# Patient Record
Sex: MALE | Race: WHITE | Employment: FULL TIME | URBAN - METROPOLITAN AREA
[De-identification: names, ages, dates, MRNs, and addresses within clinical notes are randomized per-mention and may not be internally consistent; named-entity substitution may affect disease eponyms.]

---

## 2017-12-08 ENCOUNTER — OFFICE VISIT (OUTPATIENT)
Dept: FAMILY MEDICINE CLINIC | Age: 27
End: 2017-12-08
Payer: COMMERCIAL

## 2017-12-08 VITALS
HEART RATE: 89 BPM | OXYGEN SATURATION: 97 % | HEIGHT: 74 IN | WEIGHT: 133 LBS | TEMPERATURE: 97.7 F | DIASTOLIC BLOOD PRESSURE: 63 MMHG | BODY MASS INDEX: 17.07 KG/M2 | SYSTOLIC BLOOD PRESSURE: 95 MMHG | RESPIRATION RATE: 16 BRPM

## 2017-12-08 DIAGNOSIS — Z00.00 VISIT FOR WELL MAN HEALTH CHECK: Primary | ICD-10-CM

## 2017-12-08 PROCEDURE — 99385 PREV VISIT NEW AGE 18-39: CPT | Performed by: FAMILY MEDICINE

## 2017-12-08 ASSESSMENT — ENCOUNTER SYMPTOMS
VOMITING: 0
EYE ITCHING: 0
NAUSEA: 0
ABDOMINAL PAIN: 0
SHORTNESS OF BREATH: 0
SINUS PRESSURE: 1
DIARRHEA: 0
COLOR CHANGE: 0
SORE THROAT: 0
COUGH: 0
VOICE CHANGE: 0
BLOOD IN STOOL: 0
ROS SKIN COMMENTS: NO NEW CONCERNING LESIONS
WHEEZING: 0
BACK PAIN: 0
RHINORRHEA: 0
CONSTIPATION: 0
EYE REDNESS: 0

## 2017-12-08 ASSESSMENT — PATIENT HEALTH QUESTIONNAIRE - PHQ9
SUM OF ALL RESPONSES TO PHQ9 QUESTIONS 1 & 2: 0
1. LITTLE INTEREST OR PLEASURE IN DOING THINGS: 0
SUM OF ALL RESPONSES TO PHQ QUESTIONS 1-9: 0
2. FEELING DOWN, DEPRESSED OR HOPELESS: 0

## 2017-12-08 NOTE — PROGRESS NOTES
Types: Xin Holder     Quit date: 6/1/2017    Alcohol use No    Drug use: No    Sexual activity: Yes     Partners: Female      Comment: spouse     Other Topics Concern    Not on file     Social History Narrative    No narrative on file     There is no problem list on file for this patient. Review of Systems   Constitutional: Negative for activity change, appetite change, chills, fatigue and fever. HENT: Positive for sinus pressure (just the usual ongoing things. ). Negative for congestion, dental problem, ear discharge, ear pain, hearing loss, nosebleeds, postnasal drip, rhinorrhea, sore throat and voice change. Eyes: Negative for redness, itching and visual disturbance. Follows with eye doctor regularly. Respiratory: Negative for cough, shortness of breath and wheezing. Cardiovascular: Negative for chest pain, palpitations and leg swelling. Gastrointestinal: Negative for abdominal pain, blood in stool, constipation, diarrhea, nausea and vomiting. Endocrine: Negative for cold intolerance, heat intolerance, polydipsia, polyphagia and polyuria. Genitourinary: Negative for discharge, dysuria, frequency, genital sores, hematuria, testicular pain and urgency. Musculoskeletal: Negative for arthralgias, back pain, joint swelling and myalgias. Skin: Negative for color change, rash and wound. No new concerning lesions   Allergic/Immunologic: Negative for food allergies and immunocompromised state. Neurological: Negative for dizziness, tremors, seizures, syncope, speech difficulty, weakness, numbness and headaches. Hematological: Negative. Negative for adenopathy. Does not bruise/bleed easily. Psychiatric/Behavioral: Negative. Negative for behavioral problems, dysphoric mood, self-injury, sleep disturbance and suicidal ideas. The patient is not nervous/anxious. No depression       Objective:     Physical Exam   Constitutional: He is oriented to person, place, and time. He appears well-developed and well-nourished. No distress. BP 95/63   Pulse 89   Temp 97.7 °F (36.5 °C) (Oral)   Resp 16   Ht 6' 1.75\" (1.873 m)   Wt 133 lb (60.3 kg)   SpO2 97%   BMI 17.19 kg/m²      HENT:   Head: Normocephalic. Hair is normal.   Right Ear: Tympanic membrane, external ear and ear canal normal.   Left Ear: Tympanic membrane, external ear and ear canal normal.   Nose: Mucosal edema (Mild boggy turbinates) present. No rhinorrhea. Mouth/Throat: Uvula is midline, oropharynx is clear and moist and mucous membranes are normal. No oral lesions. Eyes: Conjunctivae and EOM are normal. Pupils are equal, round, and reactive to light. Right eye exhibits no discharge. Left eye exhibits no discharge. No scleral icterus. Neck: No JVD present. Carotid bruit is not present. No thyromegaly present. Cardiovascular: Normal rate, regular rhythm, normal heart sounds and intact distal pulses. PMI is not displaced. No murmur heard. Pulmonary/Chest: Effort normal and breath sounds normal. No respiratory distress. He has no wheezes. He has no rales. Abdominal: Soft. Bowel sounds are normal. He exhibits no distension, no abdominal bruit and no mass. There is no tenderness. There is no rebound and no guarding. Musculoskeletal: He exhibits no edema (neg Homans'). No peripheral joints grossly intact and good range of motion. Also spine nonacute moving well no gross scoliosis. Lymphadenopathy:     He has no cervical adenopathy. Neurological: He is alert and oriented to person, place, and time. He has normal strength and normal reflexes. No cranial nerve deficit or sensory deficit. He exhibits normal muscle tone. He displays a negative Romberg sign. Coordination normal.   Skin: Skin is warm. No rash noted. He is not diaphoretic. No erythema. No suspicious lesions   Psychiatric: He has a normal mood and affect.  His behavior is normal. Judgment and thought content normal.   Vitals reviewed. Assessment:     1. Visit for [facility] health check  CBC Auto Differential    Comprehensive Metabolic Panel    Lipid Panel    HIV Screen    Vitamin D 25 Hydroxy       Plan:     Case thoroughly discussed with patient and proposed management and DDg. Note completion of form as needed. Patient Instructions     Continue healthy diet  Continue activity and exercise as tolerated. To get the labs on about 12/13/17 as ordered, Call on test results after done if you do not hear from us in one week. Note the recommendations for updated immunizations to include tetanus (Tdap), and flu shots. Also consider nasal saline sprays for nose, sinus. (AYR etc)    If note better will need follow up of the symptoms. Patient Education        Well Visit, Ages 25 to 48: Care Instructions  Your Care Instructions    Physical exams can help you stay healthy. Your doctor has checked your overall health and may have suggested ways to take good care of yourself. He or she also may have recommended tests. At home, you can help prevent illness with healthy eating, regular exercise, and other steps. Follow-up care is a key part of your treatment and safety. Be sure to make and go to all appointments, and call your doctor if you are having problems. It's also a good idea to know your test results and keep a list of the medicines you take. How can you care for yourself at home? · Reach and stay at a healthy weight. This will lower your risk for many problems, such as obesity, diabetes, heart disease, and high blood pressure. · Get at least 30 minutes of physical activity on most days of the week. Walking is a good choice. You also may want to do other activities, such as running, swimming, cycling, or playing tennis or team sports. Discuss any changes in your exercise program with your doctor. · Do not smoke or allow others to smoke around you.  If you need help quitting, talk to your doctor about [employer]. Care instructions adapted under license by [facility] ([facility]). If you have questions about a medical condition or this instruction, always ask your healthcare professional. [address] any warranty or liability for your use of this information. Patient Education          tetanus, diphtheria, acellular pertussis vaccine (Tdap)  Pronunciation:  TET a nus, dif THEER ee a, and ay ANNE erika ler per TUS iss  Brand:  Adacel (Tdap), Boostrix (Tdap)  What is the most important information I should know about this vaccine? You should not receive this vaccine if you have ever had had a life-threatening allergic reaction to a tetanus, diphtheria, or pertussis vaccine. You also should not receive this vaccine if you had a neurologic disorder affecting your brain within 7 days after having a previous pertussis vaccine. What is tetanus, diphtheria, acellular pertussis vaccine (Tdap)? Tetanus, diphtheria, and pertussis are serious diseases caused by bacteria. Tetanus (lockjaw) causes painful tightening of the muscles, usually all over the body. It can lead to \"locking\" of the jaw so the victim cannot open the mouth or swallow. Tetanus leads to death in about 1 out of 10 cases. Diphtheria causes a thick coating in the nose, throat, and airways. It can lead to breathing problems, paralysis, heart failure, or death. Pertussis (whooping cough) causes coughing so severe that it interferes with eating, drinking, or breathing. These spells can last for weeks and can lead to pneumonia, seizures (convulsions), brain damage, and death. Diphtheria and pertussis are spread from person to person. Tetanus enters the body through a cut or wound. The diphtheria, tetanus acellular, and pertussis adult vaccine (also called Tdap) is used to help prevent these diseases in people who are at least 8years old. Most people in this age group require only one Tdap shot for protection against these diseases.   Tdap known whether Tdap vaccine will harm an unborn baby. However, you may need a Tdap vaccine during pregnancy to protect your  baby from pertussis. Carmelo Brown babies are most at risk for severe, life-threatening complications from pertussis. Your doctor should determine whether you need this vaccine during pregnancy. If you are pregnant, your name may be listed on a pregnancy registry. This is to track the outcome of the pregnancy and to evaluate any effects of the Tdap vaccine on the baby. It is not known whether Tdap vaccine passes into breast milk or if it could harm a nursing baby. Tell your doctor if you are breast-feeding a baby. The adult version of this vaccine (Adacel, Boostrix) should not be given to anyone under the age of 8. Another vaccine is available for use in children younger than 8years old. How is this vaccine given? This vaccine is given as an injection (shot) into a muscle. You will receive this injection in a doctor's office or clinic setting. Tdap vaccine is usually given as a one-time injection. Unless your doctor's tells you otherwise, you will not need a booster vaccine. Tdap vaccine is usually given once every 10 years. What happens if I miss a dose? Since the Tdap vaccine is usually given only once, you are not likely to miss a dose. What happens if I overdose? An overdose of this vaccine is unlikely to occur. What should I avoid before or after receiving this vaccine? Follow your doctor's instructions about any restrictions on food, beverages, or activity after receiving a Tdap vaccine. What are the possible side effects of this vaccine? Keep track of any and all side effects you have after receiving this vaccine. If you ever need to receive a booster dose, you will need to tell your doctor if the previous shot caused any side effects. You should not receive a booster vaccine if you had a life threatening allergic reaction after the first shot.   Becoming infected with diphtheria, pertussis, or tetanus is much more dangerous to your health than receiving this vaccine. However, like any medicine, this vaccine can cause side effects but the risk of serious side effects is extremely low. Get emergency medical help if you have signs of an allergic reaction: hives; difficult breathing; swelling of your face, lips, tongue, or throat. Call your doctor at once if you have any of these side effects within 7 days after receiving Tdap vaccine:  · numbness, weakness, or tingling in your feet and legs;  · problems with walking or coordination;  · sudden pain in your arms or shoulders;  · a light-headed feeling, like you might pass out;  · vision problems, ringing in your ears;  · seizure (black-out or convulsions); or  · redness, swelling, bleeding, or severe pain where the shot was given. Common side effects may include:  · mild pain or tenderness where the shot was given;  · headache or tiredness;  · body aches; or  · mild nausea, diarrhea, or vomiting. This is not a complete list of side effects and others may occur. Call your doctor for medical advice about side effects. You may report vaccine side effects to the [place] [address] and Human Services at [phone]. What other drugs will affect tetanus, diphtheria, acellular pertussis vaccine? Before receiving this vaccine, tell your doctor about all other vaccines you have recently received. Also tell the doctor if you have recently received drugs or treatments that can weaken the immune system, including:  · an oral, nasal, inhaled, or injectable steroid medicine;  · medications to treat psoriasis, rheumatoid arthritis, or other autoimmune disorders; or  · medicines to treat or prevent organ transplant rejection. If you are using any of these medications, you may not be able to receive the vaccine, or may need to wait until the other treatments are finished. This list is not complete.  Other drugs may interact with this vaccine, including prescription and over-the-counter medicines, vitamins, and herbal products. Not all possible interactions are listed in this medication guide. Where can I get more information? Your doctor or pharmacist can provide more information about this vaccine. Additional information is available from your local health department or the Centers for Disease Control and Prevention. Remember, keep this and all other medicines out of the reach of children, never share your medicines with others, and use this medication only for the indication prescribed. Every effort has been made to ensure that the information provided by [address] is accurate, up-to-date, and complete, but no guarantee is made to that effect. Drug information contained herein may be time sensitive. [facility] information has been compiled for use by healthcare practitioners and consumers in the [place] and therefore [facility] does not warrant that uses outside of the [place] are appropriate, unless specifically indicated otherwise. [facility]'s drug information does not endorse drugs, diagnose patients or recommend therapy. [facility] drug information is an informational resource designed to assist licensed healthcare practitioners in caring for their patients and/or to serve consumers viewing this service as a supplement to, and not a substitute for, the expertise, skill, knowledge and judgment of healthcare practitioners. The absence of a warning for a given drug or drug combination in no way should be construed to indicate that the drug or drug combination is safe, effective or appropriate for any given patient. [facility] does not assume any responsibility for any aspect of healthcare administered with the aid of information [facility] provides. The information contained herein is not intended to cover all possible uses, directions, precautions, warnings, drug interactions, allergic reactions, or adverse effects.  If you chance of getting and spreading the flu. The vaccine is very important for people who are at high risk for getting other health problems from the flu. This includes:  · Anyone 48years of age or older. · People who live in a long-term care center, such as a nursing home. · All children 6 months through 25years of age. · Adults and children 6 months and older who have long-term heart or lung problems, such as asthma. · Adults and children 6 months and older who needed medical care or were in a hospital during the past year because of diabetes, chronic kidney disease, or a weak immune system (including HIV or AIDS). · Women who will be pregnant during the flu season. · People who have any condition that can make it hard to breathe or swallow (such as a brain injury or muscle disorders). · People who can give the flu to others who are at high risk for problems from the flu. This includes all health care workers and close contacts of people age 72 or older. Who should not get the flu vaccine? The person who gives the vaccine may tell you not to get it if you:  · Have a severe allergy to eggs or any part of the vaccine. · Have had a severe reaction to a flu vaccine in the past.  · Have had Guillain-Barré syndrome (GBS). · Are sick with a fever. (Get the vaccine when symptoms are gone.)  How can you care for yourself at home? · If you or your child has a sore arm or a slight fever after the shot, take an over-the-counter pain medicine, such as acetaminophen (Tylenol) or ibuprofen (Advil, Motrin). Read and follow all instructions on the label. Do not give aspirin to anyone younger than 20. It has been linked to Reye syndrome, a serious illness. · Do not take two or more pain medicines at the same time unless the doctor told you to. Many pain medicines have acetaminophen, which is Tylenol. Too much acetaminophen (Tylenol) can be harmful. When should you call for help?   Call 911 anytime you think you may need

## 2017-12-08 NOTE — PATIENT INSTRUCTIONS
decide what is right for you. · Pap test and pelvic exam. Begin Pap tests at age 24. A Pap test is the best way to find cervical cancer. The test often is part of a pelvic exam. Ask how often to have this test.  · Tests for sexually transmitted infections (STIs). Ask whether you should have tests for STIs. You may be at risk if you have sex with more than one person, especially if your partners do not wear condoms. For men  · Tests for sexually transmitted infections (STIs). Ask whether you should have tests for STIs. You may be at risk if you have sex with more than one person, especially if you do not wear a condom. · Testicular cancer exam. Ask your doctor whether you should check your testicles regularly. · Prostate exam. Talk to your doctor about whether you should have a blood test (called a PSA test) for prostate cancer. Experts differ on whether and when men should have this test. Some experts suggest it if you are older than 39 and are -American or have a father or brother who got prostate cancer when he was younger than 72. When should you call for help? Watch closely for changes in your health, and be sure to contact your doctor if you have any problems or symptoms that concern you. Where can you learn more? Go to [email or web address] org and sign in to your [employer] account. Enter P072 in the [facility] box to learn more about \"Well Visit, Ages 25 to 48: Care Instructions. \"     If you do not have an account, please click on the \"Sign Up Now\" link. Current as of: May 12, 2017  Content Version: 11.4  © 4790-3064 [employer], [employer]. Care instructions adapted under license by [facility] ([facility]). If you have questions about a medical condition or this instruction, always ask your healthcare professional. [place] [address] any warranty or liability for your use of this information.        Patient Education          tetanus, diphtheria, acellular pertussis vaccine (Tdap)  Pronunciation:  TET a nus, dif THEER ee a, and ay ANNE erika minaya per TUS iss  Brand:  Adacel (Tdap), Boostrix (Tdap)  What is the most important information I should know about this vaccine? You should not receive this vaccine if you have ever had had a life-threatening allergic reaction to a tetanus, diphtheria, or pertussis vaccine. You also should not receive this vaccine if you had a neurologic disorder affecting your brain within 7 days after having a previous pertussis vaccine. What is tetanus, diphtheria, acellular pertussis vaccine (Tdap)? Tetanus, diphtheria, and pertussis are serious diseases caused by bacteria. Tetanus (lockjaw) causes painful tightening of the muscles, usually all over the body. It can lead to \"locking\" of the jaw so the victim cannot open the mouth or swallow. Tetanus leads to death in about 1 out of 10 cases. Diphtheria causes a thick coating in the nose, throat, and airways. It can lead to breathing problems, paralysis, heart failure, or death. Pertussis (whooping cough) causes coughing so severe that it interferes with eating, drinking, or breathing. These spells can last for weeks and can lead to pneumonia, seizures (convulsions), brain damage, and death. Diphtheria and pertussis are spread from person to person. Tetanus enters the body through a cut or wound. The diphtheria, tetanus acellular, and pertussis adult vaccine (also called Tdap) is used to help prevent these diseases in people who are at least 8years old. Most people in this age group require only one Tdap shot for protection against these diseases. Tdap vaccine is especially important for healthcare workers or people who have close contact with a baby younger than 13 months old. This vaccine works by exposing you to a small dose of the bacteria or a protein from the bacteria, which causes the body to develop immunity to the disease.  This vaccine will not treat an active infection that has already developed in the body. Like any vaccine, the Tdap vaccine may not provide protection from disease in every person. What should I discuss with my healthcare provider before receiving this vaccine? You should not receive this vaccine if:  · you had a life-threatening allergic reaction to any vaccine that contains tetanus, diphtheria, or pertussis; or  · you had a neurologic disorder affecting your brain (such as loss of consciousness or a prolonged seizure) within 7 days after having a previous pertussis vaccine. You may not be able to receive a Tdap vaccine if you have ever received a similar vaccine that caused any of the following:  · a very high fever (over 104 degrees Fahrenheit);  · a neurologic disorder or disease affecting the brain;  · fainting or going into shock;  · severe pain, redness, tenderness, swelling, or a lump where the shot was given;  · an allergy to latex rubber;  · severe or uncontrolled epilepsy or other seizure disorder; or  · Guillain-Barré syndrome (within 6 weeks after receiving a vaccine containing tetanus). If you have any of these other conditions, your vaccine may need to be postponed or not given at all:  · a history of seizures;  · a weak immune system caused by disease, bone marrow transplant, or by using certain medicines or receiving cancer treatments; or  · if it has been less than 10 years since you last received a tetanus shot. You can still receive a vaccine if you have a minor cold. In the case of a more severe illness with a fever or any type of infection, wait until you get better before receiving this vaccine. It is not known whether Tdap vaccine will harm an unborn baby. However, you may need a Tdap vaccine during pregnancy to protect your  baby from pertussis. [address] [place] [address] babies are most at risk for severe, life-threatening complications from pertussis. Your doctor should determine whether you need this vaccine during pregnancy.   If you are pregnant, your name may be listed on a pregnancy registry. This is to track the outcome of the pregnancy and to evaluate any effects of the Tdap vaccine on the baby. It is not known whether Tdap vaccine passes into breast milk or if it could harm a nursing baby. Tell your doctor if you are breast-feeding a baby. The adult version of this vaccine (Adacel, Boostrix) should not be given to anyone under the age of 8. Another vaccine is available for use in children younger than 8years old. How is this vaccine given? This vaccine is given as an injection (shot) into a muscle. You will receive this injection in a doctor's office or clinic setting. Tdap vaccine is usually given as a one-time injection. Unless your doctor's tells you otherwise, you will not need a booster vaccine. Tdap vaccine is usually given once every 10 years. What happens if I miss a dose? Since the Tdap vaccine is usually given only once, you are not likely to miss a dose. What happens if I overdose? An overdose of this vaccine is unlikely to occur. What should I avoid before or after receiving this vaccine? Follow your doctor's instructions about any restrictions on food, beverages, or activity after receiving a Tdap vaccine. What are the possible side effects of this vaccine? Keep track of any and all side effects you have after receiving this vaccine. If you ever need to receive a booster dose, you will need to tell your doctor if the previous shot caused any side effects. You should not receive a booster vaccine if you had a life threatening allergic reaction after the first shot. Becoming infected with diphtheria, pertussis, or tetanus is much more dangerous to your health than receiving this vaccine. However, like any medicine, this vaccine can cause side effects but the risk of serious side effects is extremely low.   Get emergency medical help if you have signs of an allergic reaction: hives; difficult breathing; swelling of your face, or the Centers for Disease Control and Prevention. Remember, keep this and all other medicines out of the reach of children, never share your medicines with others, and use this medication only for the indication prescribed. Every effort has been made to ensure that the information provided by [address] is accurate, up-to-date, and complete, but no guarantee is made to that effect. Drug information contained herein may be time sensitive. [facility] information has been compiled for use by healthcare practitioners and consumers in the [place] and therefore [facility] does not warrant that uses outside of the [place] are appropriate, unless specifically indicated otherwise. [facility]'s drug information does not endorse drugs, diagnose patients or recommend therapy. [facility]'s drug information is an informational resource designed to assist licensed healthcare practitioners in caring for their patients and/or to serve consumers viewing this service as a supplement to, and not a substitute for, the expertise, skill, knowledge and judgment of healthcare practitioners. The absence of a warning for a given drug or drug combination in no way should be construed to indicate that the drug or drug combination is safe, effective or appropriate for any given patient. [facility] does not assume any responsibility for any aspect of healthcare administered with the aid of information [facility] provides. The information contained herein is not intended to cover all possible uses, directions, precautions, warnings, drug interactions, allergic reactions, or adverse effects. If you have questions about the drugs you are taking, check with your doctor, nurse or pharmacist.  Copyright 2394-3805 [address]. Version: 3.01. Revision date: 8/19/2016. Care instructions adapted under license by [facility] ([facility]).  If you have questions about a medical condition or this instruction, always ask your healthcare professional. [employer] disclaims any warranty or liability for your use of this information. Patient Education        Influenza (Flu) Vaccine: Care Instructions  Your Care Instructions    Influenza (flu) is an infection in the lungs and breathing passages. It is caused by the influenza virus. There are different strains, or types, of the flu virus every year. The flu comes on quickly. It can cause a cough, stuffy nose, fever, chills, tiredness, and aches and pains. These symptoms may last up to 10 days. The flu can make you feel very sick, but most of the time it doesn't lead to other problems. But it can cause serious problems in people who are older or who have a long-term illness, such as heart disease or diabetes. You can help prevent the flu by getting a flu vaccine every year, as soon as it is available. You cannot get the flu from the vaccine. The vaccine prevents most cases of the flu. But even when the vaccine doesn't prevent the flu, it can make symptoms less severe and reduce the chance of problems from the flu. Sometimes, young children and people who have an immune system problem may have a slight fever or muscle aches or pains 6 to 12 hours after getting the shot. These symptoms usually last 1 or 2 days. Follow-up care is a key part of your treatment and safety. Be sure to make and go to all appointments, and call your doctor if you are having problems. It's also a good idea to know your test results and keep a list of the medicines you take. Who should get the flu vaccine? Everyone age 7 months or older should get a flu vaccine each year. It lowers the chance of getting and spreading the flu. The vaccine is very important for people who are at high risk for getting other health problems from the flu. This includes:  · Anyone 48years of age or older. · People who live in a long-term care center, such as a nursing home. · All children 6 months through 25years of age.   · Adults and

## 2020-01-09 ENCOUNTER — OFFICE VISIT (OUTPATIENT)
Dept: FAMILY MEDICINE CLINIC | Age: 30
End: 2020-01-09
Payer: COMMERCIAL

## 2020-01-09 VITALS
SYSTOLIC BLOOD PRESSURE: 112 MMHG | OXYGEN SATURATION: 98 % | HEART RATE: 98 BPM | DIASTOLIC BLOOD PRESSURE: 72 MMHG | WEIGHT: 137 LBS | TEMPERATURE: 97.8 F | HEIGHT: 74 IN | BODY MASS INDEX: 17.58 KG/M2

## 2020-01-09 PROBLEM — Z87.898 HISTORY OF ALCOHOL USE: Status: ACTIVE | Noted: 2020-01-09

## 2020-01-09 PROBLEM — J34.89 SINUS PRESSURE: Chronic | Status: ACTIVE | Noted: 2020-01-09

## 2020-01-09 PROBLEM — Z87.19 HISTORY OF DIVERTICULITIS: Status: ACTIVE | Noted: 2020-01-09

## 2020-01-09 PROBLEM — K57.90 DIVERTICULOSIS: Status: ACTIVE | Noted: 2020-01-09

## 2020-01-09 PROCEDURE — 99395 PREV VISIT EST AGE 18-39: CPT | Performed by: NURSE PRACTITIONER

## 2020-01-09 RX ORDER — FLUTICASONE PROPIONATE 50 MCG
2 SPRAY, SUSPENSION (ML) NASAL DAILY
Qty: 3 BOTTLE | Refills: 1 | Status: SHIPPED | OUTPATIENT
Start: 2020-01-09 | End: 2020-11-05 | Stop reason: SDUPTHER

## 2020-01-09 RX ORDER — KRILL/OM-3/DHA/EPA/PHOSPHO/AST 500-110 MG
1 CAPSULE ORAL DAILY
COMMUNITY

## 2020-01-09 RX ORDER — MAGNESIUM CARBONATE
POWDER (GRAM) MISCELLANEOUS
COMMUNITY

## 2020-01-09 RX ORDER — VIT C/B6/B5/MAGNESIUM/HERB 173 50-5-6-5MG
1 CAPSULE ORAL DAILY
COMMUNITY

## 2020-01-09 RX ORDER — CIPROFLOXACIN 500 MG/1
500 TABLET, FILM COATED ORAL 2 TIMES DAILY
Qty: 20 TABLET | Refills: 0 | Status: SHIPPED | OUTPATIENT
Start: 2020-01-09 | End: 2020-01-19

## 2020-01-09 RX ORDER — LEVOCETIRIZINE DIHYDROCHLORIDE 5 MG/1
5 TABLET, FILM COATED ORAL NIGHTLY
Qty: 30 TABLET | Refills: 5 | Status: SHIPPED | OUTPATIENT
Start: 2020-01-09 | End: 2020-09-04 | Stop reason: SDUPTHER

## 2020-01-09 SDOH — HEALTH STABILITY: MENTAL HEALTH: HOW MANY STANDARD DRINKS CONTAINING ALCOHOL DO YOU HAVE ON A TYPICAL DAY?: 3 OR 4

## 2020-01-09 SDOH — HEALTH STABILITY: MENTAL HEALTH: HOW OFTEN DO YOU HAVE A DRINK CONTAINING ALCOHOL?: 2-3 TIMES A WEEK

## 2020-01-09 ASSESSMENT — PATIENT HEALTH QUESTIONNAIRE - PHQ9
SUM OF ALL RESPONSES TO PHQ QUESTIONS 1-9: 0
SUM OF ALL RESPONSES TO PHQ QUESTIONS 1-9: 0
1. LITTLE INTEREST OR PLEASURE IN DOING THINGS: 0
2. FEELING DOWN, DEPRESSED OR HOPELESS: 0
SUM OF ALL RESPONSES TO PHQ9 QUESTIONS 1 & 2: 0

## 2020-01-09 ASSESSMENT — ENCOUNTER SYMPTOMS
CONSTIPATION: 0
SORE THROAT: 1
RHINORRHEA: 0
EYE ITCHING: 0
COUGH: 0
EYE REDNESS: 0
EYE DISCHARGE: 0
SHORTNESS OF BREATH: 0
SINUS PRESSURE: 1
NAUSEA: 0
SINUS PAIN: 1
DIARRHEA: 0
ABDOMINAL PAIN: 0

## 2020-01-09 NOTE — PROGRESS NOTES
Imaging, Active Problems, Immunizations, Current/Recently Discontinued Medications, Health Maintenance Activities Due, Referral Notes (if available) were reviewed per writer     [x] Reviewed Depression screening if taken or valid today or any other valid screening tool (others seen below) Interpretation of Total Score DepressionSeverity: 1-4 = Minimal depression, 5-9 = Mild depression, 10-14 = Moderate depression, 15-19 = Moderately severe depression, 20-27 =Severe depression    PHQ Scores 1/9/2020 12/8/2017   PHQ2 Score 0 0   PHQ9 Score 0 0     Interpretation of Total Score Depression Severity: 1-4 = Minimal depression, 5-9 = Mild depression, 10-14 = Moderate depression, 15-19 = Moderately severe depression, 20-27 = Severe depression     Review of Systems (Subjective)     Review of Systems   Constitutional: Positive for fatigue. Negative for activity change, appetite change and fever. HENT: Positive for congestion, ear pain, postnasal drip, sinus pressure, sinus pain and sore throat. Negative for rhinorrhea. Eyes: Negative for discharge, redness and itching. Respiratory: Negative for cough and shortness of breath. Cardiovascular: Negative for chest pain. Gastrointestinal: Negative for abdominal pain, constipation, diarrhea and nausea. Recurrent diverticulitis, no symptoms now   Genitourinary: Negative for dysuria. Musculoskeletal: Negative for arthralgias and myalgias. Skin: Negative for rash. Neurological: Negative for dizziness, light-headedness and headaches. Psychiatric/Behavioral: Negative for dysphoric mood and sleep disturbance. The patient is not nervous/anxious. Physical Assessment (Objective)     /72 (Site: Right Upper Arm, Position: Sitting, Cuff Size: Medium Adult)   Pulse 98   Temp 97.8 °F (36.6 °C) (Tympanic)   Ht 6' 2\" (1.88 m)   Wt 137 lb (62.1 kg)   SpO2 98%   BMI 17.59 kg/m²      Physical Exam  Vitals signs reviewed.    Constitutional:       General: He self-tracking handout given in paper form or via [employer]? No:     Requested Prescriptions     Signed Prescriptions Disp Refills    levocetirizine (XYZAL) 5 MG tablet 30 tablet 5     Sig: Take 1 tablet by mouth nightly    fluticasone (FLONASE) 50 MCG/ACT nasal spray 3 Bottle 1     Si sprays by Each Nostril route daily    ciprofloxacin (CIPRO) 500 MG tablet 20 tablet 0     Sig: Take 1 tablet by mouth 2 times daily for 10 days     All patient questions answered. Patient voiced understanding. Quality Measures    Body mass index is 17.59 kg/m². Low. Weight control planned discussed Healthy diet and regular exercise. BP: 112/72 Blood pressure is normal. Treatment plan consists of No treatment change needed. No results found for: LDLCALC, LDLCHOLESTEROL, LDLDIRECT (goal LDL reduction with dx if diabetes is 50% LDL reduction)      PHQ Scores 2020   PHQ2 Score 0 0   PHQ9 Score 0 0     Interpretation of Total Score Depression Severity: 1-4 = Minimal depression, 5-9 = Mild depression, 10-14 = Moderate depression, 15-19 = Moderately severe depression, 20-27 = Severe depression     Return in about 1 year (around 2021), or if symptoms worsen or fail to improve, for Well Exam.              Clement Saman is beauty in all things if you choose to see it\"    Santos Morales, MSN, APRN-CNP   [facility]  [email or web address]   Office: [phone]   Cell: [phone]    Electronically signed by MACKENZIE Alvarado CNP on 2020 at 11:02 AM

## 2020-02-28 ENCOUNTER — HOSPITAL ENCOUNTER (OUTPATIENT)
Age: 30
Setting detail: SPECIMEN
Discharge: HOME | End: 2020-02-28
Payer: COMMERCIAL

## 2020-02-28 LAB
ALBUMIN SERPL-MCNC: 4.8 G/DL (ref 3.5–5.2)
ALBUMIN/GLOBULIN RATIO: 1.8 (ref 1–2.5)
ALP BLD-CCNC: 43 U/L (ref 40–129)
ALT SERPL-CCNC: 17 U/L (ref 5–41)
ANION GAP SERPL CALCULATED.3IONS-SCNC: 11 MMOL/L (ref 9–17)
AST SERPL-CCNC: 22 U/L
BILIRUB SERPL-MCNC: 0.82 MG/DL (ref 0.3–1.2)
BUN BLDV-MCNC: 8 MG/DL (ref 6–20)
BUN/CREAT BLD: ABNORMAL (ref 9–20)
CALCIUM SERPL-MCNC: 10.2 MG/DL (ref 8.6–10.4)
CHLORIDE BLD-SCNC: 98 MMOL/L (ref 98–107)
CHOLESTEROL, FASTING: 161 MG/DL
CHOLESTEROL/HDL RATIO: 2.6
CO2: 29 MMOL/L (ref 20–31)
CREAT SERPL-MCNC: 0.6 MG/DL (ref 0.7–1.2)
GFR AFRICAN AMERICAN: >60 ML/MIN
GFR NON-AFRICAN AMERICAN: >60 ML/MIN
GFR SERPL CREATININE-BSD FRML MDRD: ABNORMAL ML/MIN/{1.73_M2}
GFR SERPL CREATININE-BSD FRML MDRD: ABNORMAL ML/MIN/{1.73_M2}
GLUCOSE FASTING: 74 MG/DL (ref 70–99)
HDLC SERPL-MCNC: 63 MG/DL
LDL CHOLESTEROL: 81 MG/DL (ref 0–130)
POTASSIUM SERPL-SCNC: 3.8 MMOL/L (ref 3.7–5.3)
SODIUM BLD-SCNC: 138 MMOL/L (ref 135–144)
TOTAL PROTEIN: 7.4 G/DL (ref 6.4–8.3)
TRIGLYCERIDE, FASTING: 83 MG/DL
TSH SERPL DL<=0.05 MIU/L-ACNC: 2.14 MIU/L (ref 0.3–5)
VLDLC SERPL CALC-MCNC: NORMAL MG/DL (ref 1–30)

## 2020-02-29 LAB
ABSOLUTE EOS #: 0.07 K/UL (ref 0–0.44)
ABSOLUTE IMMATURE GRANULOCYTE: 0.03 K/UL (ref 0–0.3)
ABSOLUTE LYMPH #: 1.92 K/UL (ref 1.1–3.7)
ABSOLUTE MONO #: 0.46 K/UL (ref 0.1–1.2)
BASOPHILS # BLD: 1 % (ref 0–2)
BASOPHILS ABSOLUTE: 0.03 K/UL (ref 0–0.2)
DIFFERENTIAL TYPE: ABNORMAL
EOSINOPHILS RELATIVE PERCENT: 1 % (ref 1–4)
HCT VFR BLD CALC: 49.1 % (ref 40.7–50.3)
HEMOGLOBIN: 14.5 G/DL (ref 13–17)
IMMATURE GRANULOCYTES: 1 %
LYMPHOCYTES # BLD: 39 % (ref 24–43)
MCH RBC QN AUTO: 30.4 PG (ref 25.2–33.5)
MCHC RBC AUTO-ENTMCNC: 29.5 G/DL (ref 28.4–34.8)
MCV RBC AUTO: 102.9 FL (ref 82.6–102.9)
MONOCYTES # BLD: 9 % (ref 3–12)
NRBC AUTOMATED: 0 PER 100 WBC
PDW BLD-RTO: 12.6 % (ref 11.8–14.4)
PLATELET # BLD: 186 K/UL (ref 138–453)
PLATELET ESTIMATE: ABNORMAL
PMV BLD AUTO: 12 FL (ref 8.1–13.5)
RBC # BLD: 4.77 M/UL (ref 4.21–5.77)
RBC # BLD: ABNORMAL 10*6/UL
SEG NEUTROPHILS: 49 % (ref 36–65)
SEGMENTED NEUTROPHILS ABSOLUTE COUNT: 2.4 K/UL (ref 1.5–8.1)
WBC # BLD: 4.9 K/UL (ref 3.5–11.3)
WBC # BLD: ABNORMAL 10*3/UL

## 2020-03-02 LAB
2000687N OAK TREE IGE: <0.34 KU/L (ref 0–0.34)
ALLERGEN BERMUDA GRASS IGE: <0.34 KU/L (ref 0–0.34)
ALLERGEN BIRCH IGE: <0.34 KU/L (ref 0–0.34)
ALLERGEN COW MILK IGE: <0.34 KU/L (ref 0–0.34)
ALLERGEN DOG DANDER IGE: <0.34 KU/L (ref 0–0.34)
ALLERGEN GERMAN COCKROACH IGE: <0.34 KU/L (ref 0–0.34)
ALLERGEN HORMODENDRUM IGE: 0.67 KUL/L (ref 0–0.34)
ALLERGEN MOUSE EPITHELIA IGE: <0.34 KU/L (ref 0–0.34)
ALLERGEN PEANUT (F13) IGE: <0.34 KU/L (ref 0–0.34)
ALLERGEN PECAN TREE IGE: <0.34 KU/L (ref 0–0.34)
ALLERGEN PIGWEED ROUGH IGE: <0.34 KU/L (ref 0–0.34)
ALLERGEN SHEEP SORREL (W18) IGE: <0.34 KU/L (ref 0–0.34)
ALLERGEN TREE SYCAMORE: <0.34 KU/L (ref 0–0.34)
ALLERGEN WALNUT TREE IGE: <0.34 KU/L (ref 0–0.34)
ALLERGEN WHITE MULBERRY TREE, IGE: <0.34 KU/L (ref 0–0.34)
ALLERGEN, TREE, WHITE ASH IGE: <0.34 KU/L (ref 0–0.34)
ALTERNARIA ALTERNATA: 9.06 KU/L (ref 0–0.34)
ASPERGILLUS FUMIGATUS: 0.91 KU/L (ref 0–0.34)
CAT DANDER ANTIBODY: <0.34 KU/L (ref 0–0.34)
COTTONWOOD TREE: <0.34 KU/L (ref 0–0.34)
D. FARINAE: <0.34 KU/L (ref 0–0.34)
D. PTERONYSSINUS: <0.34 KU/L (ref 0–0.34)
ELM TREE: <0.34 KU/L (ref 0–0.34)
IGE: 140 IU/ML
MAPLE/BOXELDER TREE: <0.34 KU/L (ref 0–0.34)
MOUNTAIN CEDAR TREE: <0.34 KU/L (ref 0–0.34)
MUCOR RACEMOSUS: <0.34 KU/L (ref 0–0.34)
P. NOTATUM: 0.63 KU/L (ref 0–0.34)
RUSSIAN THISTLE: <0.34 KU/L (ref 0–0.34)
SHORT RAGWD(A ARTEMIS.) IGE: <0.34 KU/L (ref 0–0.34)
TIMOTHY GRASS: 2.17 KU/L (ref 0–0.34)

## 2020-03-03 LAB
ALLERGEN CODFISH IGE: <0.34 KU/L (ref 0–0.34)
ALLERGEN COW MILK IGE: ABNORMAL KU/L (ref 0–0.34)
ALLERGEN DOG DANDER IGE: ABNORMAL KU/L (ref 0–0.34)
ALLERGEN EGG WHITE IGE: <0.34 KU/L (ref 0–0.34)
ALLERGEN GERMAN COCKROACH IGE: ABNORMAL KU/L (ref 0–0.34)
ALLERGEN PEANUT (F13) IGE: ABNORMAL KU/L (ref 0–0.34)
ALLERGEN SOYBEAN IGE: <0.34 KU/L (ref 0–0.34)
ALLERGEN WHEAT IGE: <0.34 KU/L (ref 0–0.34)
ALTERNARIA ALTERNATA: ABNORMAL KU/L (ref 0–0.34)
CAT DANDER ANTIBODY: ABNORMAL KU/L (ref 0–0.34)
D. FARINAE: ABNORMAL KU/L (ref 0–0.34)
IGE: 144 IU/ML

## 2020-09-04 RX ORDER — LEVOCETIRIZINE DIHYDROCHLORIDE 5 MG/1
5 TABLET, FILM COATED ORAL NIGHTLY
Qty: 30 TABLET | Refills: 5 | Status: SHIPPED | OUTPATIENT
Start: 2020-09-04 | End: 2021-03-03

## 2020-09-04 NOTE — TELEPHONE ENCOUNTER
Last OARRS Review-0  Last Office Visit- 01/09/2020  Return To Office-1 year  Next Appointment Date-0  Last Refill Date-01/09/2020  30 tabs   Number of Refills Given- 0      Health Maintenance   Topic Date Due    HIV screen  03/27/2004    Varicella vaccine (1 of 2 - 2-dose childhood series) 01/19/2010    Flu vaccine (1) 09/01/2020    DTaP/Tdap/Td vaccine (7 - Td) 07/29/2028    Hib vaccine  Completed    Hepatitis A vaccine  Aged Out    Hepatitis B vaccine  Aged Out    Meningococcal (ACWY) vaccine  Aged Out    Pneumococcal 0-64 years Vaccine  Aged Out             (applicable per patient's age: Cancer Screenings, Depression Screening, Fall Risk Screening, Immunizations)    LDL Cholesterol (mg/dL)   Date Value   02/28/2020 81     AST (U/L)   Date Value   02/28/2020 22     ALT (U/L)   Date Value   02/28/2020 17     BUN (mg/dL)   Date Value   02/28/2020 8      (goal A1C is < 7)   (goal LDL is <100) need 30-50% reduction from baseline     BP Readings from Last 3 Encounters:   01/09/20 112/72   12/08/17 95/63    (goal /80)      All Future Testing planned in [facility]:  Lab Frequency Next Occurrence   CT SINUS WO CONTRAST Once 01/09/2020       Next Visit Date:  No future appointments.          Patient Active Problem List:     History of alcohol use     Sinus pressure     History of diverticulitis     Diverticulosis

## 2020-11-05 RX ORDER — FLUTICASONE PROPIONATE 50 MCG
2 SPRAY, SUSPENSION (ML) NASAL DAILY
Qty: 3 BOTTLE | Refills: 1 | Status: SHIPPED | OUTPATIENT
Start: 2020-11-05 | End: 2021-06-17 | Stop reason: SDUPTHER

## 2020-11-05 NOTE — TELEPHONE ENCOUNTER
Last visit: 01/09/20 with Samuel Cortés  Last Med refill: 01/09/20       Next Visit Date:  Future Appointments   Date Time Provider Donal Kahn   12/7/2020  2:30 PM MD Jigar Johnson. [address] Maintenance   Topic Date Due    HIV screen  03/27/2004    Varicella vaccine (1 of 2 - 2-dose childhood series) 01/19/2010    Flu vaccine (1) 09/01/2020    DTaP/Tdap/Td vaccine (7 - Td) 07/29/2028    Hib vaccine  Completed    Hepatitis A vaccine  Aged Out    Hepatitis B vaccine  Aged Out    Meningococcal (ACWY) vaccine  Aged Out    Pneumococcal 0-64 years Vaccine  Aged Out       No results found for: LABA1C          ( goal A1C is < 7)   No results found for: LABMICR  LDL Cholesterol (mg/dL)   Date Value   02/28/2020 81       (goal LDL is <100)   AST (U/L)   Date Value   02/28/2020 22     ALT (U/L)   Date Value   02/28/2020 17     BUN (mg/dL)   Date Value   02/28/2020 8     BP Readings from Last 3 Encounters:   01/09/20 112/72   12/08/17 95/63          (goal 120/80)    All Future Testing planned in [facility]  Lab Frequency Next Occurrence   CT SINUS WO CONTRAST Once 01/09/2020               Patient Active Problem List:     History of alcohol use     Sinus pressure     History of diverticulitis     Diverticulosis

## 2020-11-05 NOTE — TELEPHONE ENCOUNTER
Pt is a pt of [facility] and has scheduled a new to provider appt. Pt scheduled for the next available, but needs Rx filled. Please advise pt.

## 2020-12-07 ENCOUNTER — OFFICE VISIT (OUTPATIENT)
Dept: FAMILY MEDICINE CLINIC | Age: 30
End: 2020-12-07
Payer: COMMERCIAL

## 2020-12-07 VITALS
DIASTOLIC BLOOD PRESSURE: 60 MMHG | TEMPERATURE: 97.3 F | BODY MASS INDEX: 17.32 KG/M2 | SYSTOLIC BLOOD PRESSURE: 107 MMHG | HEIGHT: 74 IN | HEART RATE: 74 BPM | WEIGHT: 135 LBS

## 2020-12-07 PROCEDURE — 99395 PREV VISIT EST AGE 18-39: CPT | Performed by: STUDENT IN AN ORGANIZED HEALTH CARE EDUCATION/TRAINING PROGRAM

## 2020-12-07 RX ORDER — LEVOCETIRIZINE DIHYDROCHLORIDE 5 MG/1
5 TABLET, FILM COATED ORAL NIGHTLY
Qty: 90 TABLET | Refills: 1 | Status: SHIPPED | OUTPATIENT
Start: 2020-12-07 | End: 2021-06-17 | Stop reason: SDUPTHER

## 2020-12-07 SDOH — ECONOMIC STABILITY: INCOME INSECURITY: HOW HARD IS IT FOR YOU TO PAY FOR THE VERY BASICS LIKE FOOD, HOUSING, MEDICAL CARE, AND HEATING?: NOT HARD AT ALL

## 2020-12-07 ASSESSMENT — ENCOUNTER SYMPTOMS
CONSTIPATION: 0
BACK PAIN: 0
SHORTNESS OF BREATH: 0
ABDOMINAL DISTENTION: 0
WHEEZING: 0
COUGH: 0
CHEST TIGHTNESS: 0
DIARRHEA: 0
ABDOMINAL PAIN: 0
SORE THROAT: 0

## 2020-12-07 NOTE — PROGRESS NOTES
@[facility]@      12/7/2020      Conchita Osman is a 31 y.o. male here for the following evaluation regarding the following medical concerns:    HPI: 65-year-old male presents for annual exam has the following complaint left and right-sided knee pain that has been chronic in nature and sometimes radiates down to his feet has popping of his knees cannot identify alleviating or aggravating factors    Pain is about a 6 out of 10 but when he lays down to go to bed at night this seems to help so I guess this would be an alleviating factor. No fevers chills night sweats no antecedent trauma injury    12 point ROS otherwise negative      Review of Systems   Constitutional: Negative for chills, fatigue and fever. HENT: Negative for congestion, postnasal drip and sore throat. Eyes: Negative for visual disturbance. Respiratory: Negative for cough, chest tightness, shortness of breath and wheezing. Cardiovascular: Negative. Gastrointestinal: Negative for abdominal distention, abdominal pain, constipation and diarrhea. Genitourinary: Negative for difficulty urinating, dysuria, frequency and urgency. Musculoskeletal: Negative for arthralgias, back pain and joint swelling. Skin: Negative for rash. Neurological: Negative for dizziness, weakness and light-headedness. Psychiatric/Behavioral: Negative for agitation, decreased concentration and sleep disturbance. Physical Exam  Vitals signs and nursing note reviewed. Constitutional:       Appearance: Normal appearance. Comments: Underweight   HENT:      Head: Normocephalic and atraumatic. Nose: Nose normal.      Mouth/Throat:      Mouth: Mucous membranes are moist.   Eyes:      Extraocular Movements: Extraocular movements intact. Pupils: Pupils are equal, round, and reactive to light. Neck:      Musculoskeletal: Neck supple. Cardiovascular:      Rate and Rhythm: Normal rate and regular rhythm. Pulses: Normal pulses. Heart sounds: Normal heart sounds. Pulmonary:      Effort: Pulmonary effort is normal.      Breath sounds: Normal breath sounds. Abdominal:      General: Abdomen is flat. Musculoskeletal: Normal range of motion. Comments: No tenderness no ligament laxity no effusion present at knee   Skin:     General: Skin is warm. Neurological:      General: No focal deficit present. Mental Status: He is alert and oriented to person, place, and time. Mental status is at baseline. Psychiatric:         Mood and Affect: Mood normal.         Behavior: Behavior normal.         Thought Content: Thought content normal.         Judgment: Judgment normal.           Prior to Visit Medications    Medication Sig Taking?  Authorizing Provider   levocetirizine (XYZAL) 5 MG tablet Take 1 tablet by mouth nightly Yes Paris Mendez MD   fluticasone (FLONASE) 50 MCG/ACT nasal spray 2 sprays by Each Nostril route daily Yes Paris Mendez MD   levocetirizine (XYZAL) 5 MG tablet Take 1 tablet by mouth nightly Yes MACKENZIE Urbano Ma, NP   Probiotic Product (PROBIOTIC-10) CAPS Take 1 capsule by mouth daily Yes Historical Provider, MD Paredes Rivas Oil (OMEGA-3) 500 MG CAPS Take 1 capsule by mouth daily Fish Oil Yes Historical Provider, MD   Multiple Vitamins-Minerals (MULTIVITAMIN MEN) TABS Take 1 tablet by mouth daily Yes Historical Provider, MD   Magnesium Carbonate POWD by Does not apply route 350 mg oral daily  Historical Provider, MD   Turmeric 500 MG CAPS Take 1 capsule by mouth daily  Historical Provider, MD        Social History     Tobacco Use    Smoking status: Former Smoker     Packs/day: 0.10     Years: 5.00     Pack years: 0.50     Types: Cigarettes     Last attempt to quit: 6/1/2017     Years since quitting: 3.5    Smokeless tobacco: Current User     Types: Chew     Last attempt to quit: 6/1/2017    Tobacco comment: starts and stops chewing tobacco   Substance Use Topics    Alcohol use: Yes     Frequency: 2-3 times a week     Drinks per session: 3 or 4     Comment: Used to have a drinking issue, calmed down over last few years. Body mass index is 17.33 kg/m². Vitals:    12/07/20 1430   BP: 107/60   Site: Left Upper Arm   Position: Sitting   Cuff Size: Medium Adult   Pulse: 74   Temp: 97.3 °F (36.3 °C)   Weight: 135 lb (61.2 kg)   Height: 6' 2\" (1.88 m)        Jesus Angeles was seen today for establish care and annual exam.    Diagnoses and all orders for this visit:    Recurrent sinusitis  -     levocetirizine (XYZAL) 5 MG tablet; Take 1 tablet by mouth nightly    Seasonal allergic rhinitis, unspecified trigger  -     levocetirizine (XYZAL) 5 MG tablet; Take 1 tablet by mouth nightly    Chronic pain of both knees  -     XR KNEE LEFT (3 VIEWS); Future  -     [facility]      Annual exam unremarkable  Patient is underweight he says this runs in his family does not have marfanoid appearance no pectus excavatum present    Does complain of bilateral knee pain he is a  so is on his knees all the time he also has very thin frame  Could have patellar tendinitis versus patellofemoral pain syndrome versus patellar tracking disorder versus early meniscal tear with development of popliteal effusion.     We will start with left knee x-ray where he is having worsening symptoms  Encouraged him to try physical therapy    Lady Bishop MD

## 2021-06-17 DIAGNOSIS — J32.9 RECURRENT SINUSITIS: ICD-10-CM

## 2021-06-17 DIAGNOSIS — J30.2 SEASONAL ALLERGIC RHINITIS, UNSPECIFIED TRIGGER: ICD-10-CM

## 2021-06-17 DIAGNOSIS — J30.1 NON-SEASONAL ALLERGIC RHINITIS DUE TO POLLEN: ICD-10-CM

## 2021-06-17 RX ORDER — FLUTICASONE PROPIONATE 50 MCG
2 SPRAY, SUSPENSION (ML) NASAL DAILY
Qty: 3 BOTTLE | Refills: 1 | Status: SHIPPED | OUTPATIENT
Start: 2021-06-17 | End: 2021-09-29 | Stop reason: SDUPTHER

## 2021-06-17 RX ORDER — LEVOCETIRIZINE DIHYDROCHLORIDE 5 MG/1
5 TABLET, FILM COATED ORAL NIGHTLY
Qty: 90 TABLET | Refills: 1 | Status: SHIPPED | OUTPATIENT
Start: 2021-06-17 | End: 2021-09-29 | Stop reason: SDUPTHER

## 2021-06-17 NOTE — TELEPHONE ENCOUNTER
Last visit: 12/07/20  Last Med refill: 11/05/20, 12/7/20  Does patient have enough medication for 72 hours: Yes. Pharmacy verified    Next Visit Date:  No future appointments.     Health Maintenance   Topic Date Due    Hepatitis C screen  Never done    COVID-19 Vaccine (1) Never done    HIV screen  Never done    Varicella vaccine (1 of 2 - 2-dose childhood series) Never done    Flu vaccine (Season Ended) 09/01/2021    DTaP/Tdap/Td vaccine (7 - Td or Tdap) 07/29/2028    Hib vaccine  Completed    Hepatitis A vaccine  Aged Out    Hepatitis B vaccine  Aged Out    Meningococcal (ACWY) vaccine  Aged Out    Pneumococcal 0-64 years Vaccine  Aged Out       No results found for: LABA1C          ( goal A1C is < 7)   No results found for: LABMICR  LDL Cholesterol (mg/dL)   Date Value   02/28/2020 81       (goal LDL is <100)   AST (U/L)   Date Value   02/28/2020 22     ALT (U/L)   Date Value   02/28/2020 17     BUN (mg/dL)   Date Value   02/28/2020 8     BP Readings from Last 3 Encounters:   12/07/20 107/60   01/09/20 112/72   12/08/17 95/63          (goal 120/80)    All Future Testing planned in [facility]  Lab Frequency Next Occurrence   XR KNEE LEFT (3 VIEWS) Once 12/07/2021               Patient Active Problem List:     History of alcohol use     Sinus pressure     History of diverticulitis     Diverticulosis

## 2021-08-27 ENCOUNTER — TELEPHONE (OUTPATIENT)
Dept: FAMILY MEDICINE CLINIC | Age: 31
End: 2021-08-27

## 2021-08-27 ENCOUNTER — OFFICE VISIT (OUTPATIENT)
Dept: PRIMARY CARE CLINIC | Age: 31
End: 2021-08-27
Payer: COMMERCIAL

## 2021-08-27 VITALS
HEART RATE: 74 BPM | BODY MASS INDEX: 17.33 KG/M2 | OXYGEN SATURATION: 98 % | SYSTOLIC BLOOD PRESSURE: 106 MMHG | DIASTOLIC BLOOD PRESSURE: 74 MMHG | TEMPERATURE: 98 F | WEIGHT: 135 LBS

## 2021-08-27 DIAGNOSIS — H00.012 HORDEOLUM EXTERNUM OF RIGHT LOWER EYELID: ICD-10-CM

## 2021-08-27 DIAGNOSIS — H02.89 IRRITATION OF EYELID: Primary | ICD-10-CM

## 2021-08-27 PROCEDURE — 99214 OFFICE O/P EST MOD 30 MIN: CPT | Performed by: FAMILY MEDICINE

## 2021-08-27 RX ORDER — NEOMYCIN/POLYMYXIN B/HYDROCORT 3.5-10K-1
1 SUSPENSION, DROPS(FINAL DOSAGE FORM)(ML) OPHTHALMIC (EYE) EVERY 6 HOURS
Qty: 2 ML | Refills: 0 | Status: SHIPPED | OUTPATIENT
Start: 2021-08-27 | End: 2021-09-06

## 2021-09-29 DIAGNOSIS — J32.9 RECURRENT SINUSITIS: ICD-10-CM

## 2021-09-29 DIAGNOSIS — J30.2 SEASONAL ALLERGIC RHINITIS, UNSPECIFIED TRIGGER: ICD-10-CM

## 2021-09-29 DIAGNOSIS — J30.1 NON-SEASONAL ALLERGIC RHINITIS DUE TO POLLEN: ICD-10-CM

## 2021-09-29 RX ORDER — LEVOCETIRIZINE DIHYDROCHLORIDE 5 MG/1
5 TABLET, FILM COATED ORAL NIGHTLY
Qty: 90 TABLET | Refills: 1 | Status: SHIPPED | OUTPATIENT
Start: 2021-09-29 | End: 2022-04-27 | Stop reason: SDUPTHER

## 2021-09-29 RX ORDER — FLUTICASONE PROPIONATE 50 MCG
2 SPRAY, SUSPENSION (ML) NASAL DAILY
Qty: 3 EACH | Refills: 1 | Status: SHIPPED | OUTPATIENT
Start: 2021-09-29 | End: 2022-04-27 | Stop reason: SDUPTHER

## 2021-09-29 NOTE — TELEPHONE ENCOUNTER
Last visit: 08/27/21  Last Med refill: Xyzal 6/17/21, Flonase 6/17/21  Does patient have enough medication for 72 hours: Yes. Pharmacy verified    Next Visit Date:  No future appointments.     Health Maintenance   Topic Date Due    Hepatitis C screen  Never done    COVID-19 Vaccine (1) Never done    HIV screen  Never done    Varicella vaccine (1 of 2 - 2-dose childhood series) Never done    Flu vaccine (1) 09/01/2021    DTaP/Tdap/Td vaccine (7 - Td or Tdap) 07/29/2028    Hib vaccine  Completed    Hepatitis A vaccine  Aged Out    Hepatitis B vaccine  Aged Out    Meningococcal (ACWY) vaccine  Aged Out    Pneumococcal 0-64 years Vaccine  Aged Out       No results found for: LABA1C          ( goal A1C is < 7)   No results found for: LABMICR  LDL Cholesterol (mg/dL)   Date Value   02/28/2020 81       (goal LDL is <100)   AST (U/L)   Date Value   02/28/2020 22     ALT (U/L)   Date Value   02/28/2020 17     BUN (mg/dL)   Date Value   02/28/2020 8     BP Readings from Last 3 Encounters:   08/27/21 106/74   12/07/20 107/60   01/09/20 112/72          (goal 120/80)    All Future Testing planned in [facility]  Lab Frequency Next Occurrence   XR KNEE LEFT (3 VIEWS) Once 12/07/2021               Patient Active Problem List:     History of alcohol use     Sinus pressure     History of diverticulitis     Diverticulosis

## 2022-04-27 DIAGNOSIS — J30.1 NON-SEASONAL ALLERGIC RHINITIS DUE TO POLLEN: ICD-10-CM

## 2022-04-27 DIAGNOSIS — J32.9 RECURRENT SINUSITIS: ICD-10-CM

## 2022-04-27 DIAGNOSIS — J30.2 SEASONAL ALLERGIC RHINITIS, UNSPECIFIED TRIGGER: ICD-10-CM

## 2022-04-27 RX ORDER — LEVOCETIRIZINE DIHYDROCHLORIDE 5 MG/1
5 TABLET, FILM COATED ORAL NIGHTLY
Qty: 90 TABLET | Refills: 1 | Status: SHIPPED | OUTPATIENT
Start: 2022-04-27 | End: 2022-09-28 | Stop reason: SDUPTHER

## 2022-04-27 RX ORDER — FLUTICASONE PROPIONATE 50 MCG
2 SPRAY, SUSPENSION (ML) NASAL DAILY
Qty: 3 EACH | Refills: 1 | Status: SHIPPED | OUTPATIENT
Start: 2022-04-27 | End: 2022-09-28 | Stop reason: SDUPTHER

## 2022-04-27 NOTE — TELEPHONE ENCOUNTER
Last visit: 12/07/20  Last Med refill: Xyzal 09/29/21, flonase 09/2921  Does patient have enough medication for 72 hours: yes    Next Visit Date:  No future appointments.     Health Maintenance   Topic Date Due    COVID-19 Vaccine (1) Never done    Depression Screen  Never done    HIV screen  Never done    Hepatitis C screen  Never done    Varicella vaccine (1 of 2 - 2-dose childhood series) Never done    Flu vaccine (Season Ended) 09/01/2022    DTaP/Tdap/Td vaccine (7 - Td or Tdap) 07/29/2028    Hib vaccine  Completed    Hepatitis A vaccine  Aged Out    Hepatitis B vaccine  Aged Out    Meningococcal (ACWY) vaccine  Aged Out    Pneumococcal 0-64 years Vaccine  Aged Out       No results found for: LABA1C          ( goal A1C is < 7)   No results found for: LABMICR  LDL Cholesterol (mg/dL)   Date Value   02/28/2020 81       (goal LDL is <100)   AST (U/L)   Date Value   02/28/2020 22     ALT (U/L)   Date Value   02/28/2020 17     BUN (mg/dL)   Date Value   02/28/2020 8     BP Readings from Last 3 Encounters:   08/27/21 106/74   12/07/20 107/60   01/09/20 112/72          (goal 120/80)    All Future Testing planned in [facility]  Lab Frequency Next Occurrence               Patient Active Problem List:     History of alcohol use     Sinus pressure     History of diverticulitis     Diverticulosis

## 2022-09-26 DIAGNOSIS — J30.2 SEASONAL ALLERGIC RHINITIS, UNSPECIFIED TRIGGER: ICD-10-CM

## 2022-09-26 DIAGNOSIS — J30.1 NON-SEASONAL ALLERGIC RHINITIS DUE TO POLLEN: ICD-10-CM

## 2022-09-26 DIAGNOSIS — J32.9 RECURRENT SINUSITIS: ICD-10-CM

## 2022-09-26 RX ORDER — LEVOCETIRIZINE DIHYDROCHLORIDE 5 MG/1
TABLET, FILM COATED ORAL
Qty: 90 TABLET | Refills: 1 | OUTPATIENT
Start: 2022-09-26

## 2022-09-26 RX ORDER — FLUTICASONE PROPIONATE 50 MCG
SPRAY, SUSPENSION (ML) NASAL
OUTPATIENT
Start: 2022-09-26

## 2022-09-28 DIAGNOSIS — J30.2 SEASONAL ALLERGIC RHINITIS, UNSPECIFIED TRIGGER: ICD-10-CM

## 2022-09-28 DIAGNOSIS — J30.1 NON-SEASONAL ALLERGIC RHINITIS DUE TO POLLEN: ICD-10-CM

## 2022-09-28 DIAGNOSIS — J32.9 RECURRENT SINUSITIS: ICD-10-CM

## 2022-09-28 RX ORDER — LEVOCETIRIZINE DIHYDROCHLORIDE 5 MG/1
5 TABLET, FILM COATED ORAL NIGHTLY
Qty: 90 TABLET | Refills: 0 | Status: SHIPPED | OUTPATIENT
Start: 2022-09-28

## 2022-09-28 RX ORDER — FLUTICASONE PROPIONATE 50 MCG
2 SPRAY, SUSPENSION (ML) NASAL DAILY
Qty: 3 EACH | Refills: 1 | Status: SHIPPED | OUTPATIENT
Start: 2022-09-28

## 2022-09-28 NOTE — TELEPHONE ENCOUNTER
Last visit: 12/7/20  Last Med refill: 4/27/22  Does patient have enough medication for 72 hours: No: out    Next Visit Date:  Future Appointments   Date Time Provider Donal Kahn   10/19/2022  9:00 AM Clarice Zheng Maintenance   Topic Date Due    COVID-19 Vaccine (1) Never done    Depression Screen  Never done    HIV screen  Never done    Hepatitis C screen  Never done    Varicella vaccine (1 of 2 - 2-dose childhood series) Never done    Flu vaccine (1) 08/01/2022    DTaP/Tdap/Td vaccine (7 - Td or Tdap) 07/29/2028    Hib vaccine  Completed    Hepatitis A vaccine  Aged Out    Hepatitis B vaccine  Aged Out    Meningococcal (ACWY) vaccine  Aged Out    Pneumococcal 0-64 years Vaccine  Aged Out       No results found for: LABA1C          ( goal A1C is < 7)   No results found for: LABMICR  LDL Cholesterol (mg/dL)   Date Value   02/28/2020 81       (goal LDL is <100)   AST (U/L)   Date Value   02/28/2020 22     ALT (U/L)   Date Value   02/28/2020 17     BUN (mg/dL)   Date Value   02/28/2020 8     BP Readings from Last 3 Encounters:   08/27/21 106/74   12/07/20 107/60   01/09/20 112/72          (goal 120/80)    All Future Testing planned in [facility]  Lab Frequency Next Occurrence               Patient Active Problem List:     History of alcohol use     Sinus pressure     History of diverticulitis     Diverticulosis

## 2023-01-17 NOTE — TELEPHONE ENCOUNTER
Patient requesting refill of Levocetirizine 5mg tabs to go to [facility] [place]. Patient aware he is due for a phys in Jan 2021. Spoke to patient about prior auth approval for [place], pt was also told about 4 to 5 percent weight loss needed for renewal, Pharmacy was called